# Patient Record
Sex: FEMALE | Race: BLACK OR AFRICAN AMERICAN | URBAN - METROPOLITAN AREA
[De-identification: names, ages, dates, MRNs, and addresses within clinical notes are randomized per-mention and may not be internally consistent; named-entity substitution may affect disease eponyms.]

---

## 2021-02-26 ENCOUNTER — HOSPITAL ENCOUNTER (EMERGENCY)
Age: 41
Discharge: HOME | End: 2021-02-26
Attending: EMERGENCY MEDICINE

## 2021-02-26 VITALS
TEMPERATURE: 98.8 F | DIASTOLIC BLOOD PRESSURE: 82 MMHG | SYSTOLIC BLOOD PRESSURE: 130 MMHG | BODY MASS INDEX: 24.49 KG/M2 | RESPIRATION RATE: 16 BRPM | WEIGHT: 147 LBS | HEIGHT: 65 IN | OXYGEN SATURATION: 98 % | HEART RATE: 72 BPM

## 2021-02-26 DIAGNOSIS — S39.012A BACK STRAIN, INITIAL ENCOUNTER: Primary | ICD-10-CM

## 2021-02-26 LAB
BILIRUBIN URINE: NEGATIVE
BLOOD, URINE: ABNORMAL
CLARITY: CLEAR
COLOR: YELLOW
EPITHELIAL CELLS, UA: NORMAL /HPF (ref 0–5)
GLUCOSE URINE: NEGATIVE MG/DL
HCG(URINE) PREGNANCY TEST: NEGATIVE
KETONES, URINE: NEGATIVE MG/DL
LEUKOCYTE ESTERASE, URINE: NEGATIVE
MICROSCOPIC EXAMINATION: YES
NITRITE, URINE: NEGATIVE
PH UA: 6 (ref 5–8)
PROTEIN UA: NEGATIVE MG/DL
RBC UA: NORMAL /HPF (ref 0–4)
SPECIFIC GRAVITY UA: 1.01 (ref 1–1.03)
URINE TYPE: ABNORMAL
UROBILINOGEN, URINE: 0.2 E.U./DL
WBC UA: NORMAL /HPF (ref 0–5)

## 2021-02-26 PROCEDURE — 84703 CHORIONIC GONADOTROPIN ASSAY: CPT

## 2021-02-26 PROCEDURE — 6370000000 HC RX 637 (ALT 250 FOR IP): Performed by: PHYSICIAN ASSISTANT

## 2021-02-26 PROCEDURE — 81001 URINALYSIS AUTO W/SCOPE: CPT

## 2021-02-26 PROCEDURE — 99283 EMERGENCY DEPT VISIT LOW MDM: CPT

## 2021-02-26 RX ORDER — CYCLOBENZAPRINE HCL 10 MG
10 TABLET ORAL NIGHTLY PRN
Qty: 16 TABLET | Refills: 0 | Status: SHIPPED | OUTPATIENT
Start: 2021-02-26 | End: 2021-03-08

## 2021-02-26 RX ORDER — CYCLOBENZAPRINE HCL 10 MG
10 TABLET ORAL ONCE
Status: COMPLETED | OUTPATIENT
Start: 2021-02-26 | End: 2021-02-26

## 2021-02-26 RX ORDER — ACETAMINOPHEN 325 MG/1
650 TABLET ORAL ONCE
Status: COMPLETED | OUTPATIENT
Start: 2021-02-26 | End: 2021-02-26

## 2021-02-26 RX ADMIN — CYCLOBENZAPRINE 10 MG: 10 TABLET, FILM COATED ORAL at 10:13

## 2021-02-26 RX ADMIN — ACETAMINOPHEN 650 MG: 325 TABLET ORAL at 10:12

## 2021-02-26 ASSESSMENT — ENCOUNTER SYMPTOMS
BACK PAIN: 1
SHORTNESS OF BREATH: 0
COUGH: 0
ABDOMINAL PAIN: 0
VOMITING: 0
NAUSEA: 0
DIARRHEA: 0

## 2021-02-26 NOTE — ED PROVIDER NOTES
ED Attending Attestation Note     Date of evaluation: 2/26/2021    This patient was seen by the advance practice provider. I have seen and examined the patient, agree with the workup, evaluation, management and diagnosis. The care plan has been discussed. My assessment reveals the patient has some left-sided lumbar back pain, no lower extremity symptoms, urinalysis does not show infection.      Brenna Luke MD  02/26/21 1048

## 2021-02-26 NOTE — ED NOTES
Patient prepared for and ready to be discharged. Patient discharged at this time in no acute distress after verbalizing understanding of discharge instructions. Patient left after receiving After Visit Summary instructions.       Selina Spence RN  02/26/21 1126

## 2021-02-26 NOTE — ED PROVIDER NOTES
[facility] ENCOUNTER          PHYSICIAN ASSISTANT NOTE       Date of evaluation: 2021    Chief Complaint     Flank Pain (right sided flank pain x2 weeks)      History of Present Illness     Danette Cheadle is a 36 y.o. female with no significant medical history who comes in complaining of left-sided back pain which is been ongoing for 2 weeks and was not preceded by illness or injury. She reports that her pain is worse when lying down or when bending over and that she believes she strained a muscle in her back. She currently works as a  and does [employer] work third shift. She reports that despite ibuprofen and applying Lidoderm patches her pain has worsened over the past week and is exacerbated by working at night. She also reports that it is exacerbated by trying to manage her children at home. She reports that she has injured this area of her back once in the past several years ago but that it has since resolved until this incident. She denies knowledge of any specific injury including lifting or falling. She denies other symptoms including numbness, tingling, loss of bowel or bladder control, fevers, shortness of breath, chest pain, abdominal pain, nausea, vomiting, blood in her stool, urinary symptoms. Review of Systems     Review of Systems   Constitutional: Negative for chills and fever. Respiratory: Negative for cough and shortness of breath. Cardiovascular: Negative for chest pain. Gastrointestinal: Negative for abdominal pain, diarrhea, nausea and vomiting. Genitourinary: Negative for dysuria, flank pain, frequency and hematuria. Musculoskeletal: Positive for back pain. Negative for neck pain. Neurological: Negative for dizziness and headaches. Past Medical, Surgical, Family, and Social History     She has no past medical history on file. She has a past surgical history that includes  section.   Her family history is not on file.  She reports that she has never smoked. She does not have any smokeless tobacco history on file. She reports that she does not drink alcohol or use drugs. Medications     Previous Medications    IBUPROFEN (ADVIL;MOTRIN) 600 MG TABLET    Take 1 tablet by mouth every 6 hours as needed for Pain    ONDANSETRON (ZOFRAN ODT) 4 MG DISINTEGRATING TABLET    Take 1 tablet by mouth every 8 hours as needed for Nausea       Allergies     She has No Known Allergies. Physical Exam     INITIAL VITALS: BP: 137/89, Temp: 98.8 °F (37.1 °C), Pulse: 78, Resp: 16, SpO2: 100 %  Physical Exam  Constitutional:       Appearance: Normal appearance. HENT:      Head: Normocephalic and atraumatic. Nose: Nose normal.   Neck:      Musculoskeletal: Normal range of motion and neck supple. No muscular tenderness. Cardiovascular:      Rate and Rhythm: Normal rate and regular rhythm. Heart sounds: Normal heart sounds. No murmur. No friction rub. No gallop. Pulmonary:      Effort: Pulmonary effort is normal.      Breath sounds: Normal breath sounds. Abdominal:      General: Abdomen is flat. There is no distension. Palpations: Abdomen is soft. There is no mass. Tenderness: There is no abdominal tenderness. There is no right CVA tenderness, left CVA tenderness, guarding or rebound. Hernia: No hernia is present. Musculoskeletal: Normal range of motion. General: No deformity or signs of injury. Comments:  the patient has tenderness to palpation of the paraspinous muscle over the left thoracic region without overlying injury, abscess, cellulitic appearance or other abnormal lesion. There is no tenderness to palpation of the cervical, thoracic or lumbar midline spine. Skin:     General: Skin is dry. Neurological:      General: No focal deficit present. Mental Status: She is alert and oriented to person, place, and time.    Psychiatric:         Mood and Affect: Mood normal.         Behavior: Behavior normal.         Diagnostic Results     EKG       RADIOLOGY:  No orders to display       LABS:   Results for orders placed or performed during the hospital encounter of 02/26/21   Urinalysis, reflex to microscopic (Lab)   Result Value Ref Range    Color, UA Yellow Straw/Yellow    Clarity, UA Clear Clear    Glucose, Ur Negative Negative mg/dL    Bilirubin Urine Negative Negative    Ketones, Urine Negative Negative mg/dL    Specific Gravity, UA 1.010 1.005 - 1.030    Blood, Urine TRACE-INTACT (A) Negative    pH, UA 6.0 5.0 - 8.0    Protein, UA Negative Negative mg/dL    Urobilinogen, Urine 0.2 <2.0 E.U./dL    Nitrite, Urine Negative Negative    Leukocyte Esterase, Urine Negative Negative    Microscopic Examination YES     Urine Type NotGiven    Pregnancy, urine   Result Value Ref Range    HCG(Urine) Pregnancy Test Negative Detects HCG level >20 MIU/mL   Microscopic Urinalysis   Result Value Ref Range    WBC, UA 0-2 0 - 5 /HPF    RBC, UA 0-2 0 - 4 /HPF    Epithelial Cells, UA 0-1 0 - 5 /HPF       ED BEDSIDE ULTRASOUND:      RECENT VITALS:  BP: 130/82, Temp: 98.8 °F (37.1 °C), Pulse: 72, Resp: 16, SpO2: 98 %     Procedures         ED Course     Nursing Notes, Past Medical Hx,Past Surgical Hx, Social Hx, Allergies, and Family Hx were reviewed. The patient was given the following medications:  Orders Placed This Encounter   Medications    cyclobenzaprine (FLEXERIL) tablet 10 mg    acetaminophen (TYLENOL) tablet 650 mg       CONSULTS:  None    MEDICAL DECISION MAKING / ASSESSMENT / Rainer Keesha is a 36 y.o. female with no significant medical history who comes in complaining of left-sided back pain which is been ongoing for 2 weeks and was not preceded by illness or injury. She reports that her pain is worse when lying down or when bending over and that she believes she strained a muscle in her back. She currently works as a  and does residential work third shift.   She reports that despite ibuprofen and applying Lidoderm patches her pain has worsened over the past week and is exacerbated by working at night. She also reports that it is exacerbated by trying to manage her children at home. She reports that she has injured this area of her back once in the past several years ago but that it has since resolved until this incident. She denies knowledge of any specific injury including lifting or falling. She denies other symptoms including numbness, tingling, loss of bowel or bladder control, fevers, shortness of breath, chest pain, abdominal pain, nausea, vomiting, blood in her stool, urinary symptoms, pregnancy. She is alert and oriented x4. Vital signs are stable. On exam the patient has tenderness to palpation of the paraspinous muscle over the left thoracic region without overlying injury, abscess, cellulitic appearance or other abnormal lesion. There is no tenderness to palpation of the cervical, thoracic or lumbar midline spine. There is no CVA tenderness bilaterally. There is no abdominal tenderness to palpation. Patient has full sensation in all extremities. She has full strength in all extremities. She was administered Flexeril here in the emergency department as well as Tylenol for pain. UA was without evidence of infection. Urine pregnancy was negative. On reassessment patient stated that she was feeling slightly better. We went over the medication regiment regiment and return precautions as well as advisement that she refrain from work activities for several days. Her presentation is suggestive of muscular back strain likely related to activity at her work. She will be advised to continue over-the-counter pain medication and will also be prescribed Lidoderm patches and a muscle relaxer. She will also be advised to abstain from work-related activities for 5 days. I will write her a work note for this absence.   There is low suspicion for cauda equina as she is without loss of bowel or bladder control, saddle paresthesias. She will be given strict return precautions and is subsequently discharged in stable condition with information for establishing primary care for further evaluation her symptoms do not resolve. This patient was also evaluated by the attending physician. All care plans were discussed and agreed upon. Clinical Impression     1.  Back strain, initial encounter        Disposition     PATIENT REFERRED TO:  [facility]  [address]  [facility] [address] [facility]  [phone]    Schedule an appointment as soon as possible for a visit in 1 day  To establish primary care      DISCHARGE MEDICATIONS:  New Prescriptions    No medications on file       DISPOSITION Decision To Discharge 02/26/2021 10:33:43 AM        Leti Cortes PA-C  02/26/21 1106